# Patient Record
Sex: MALE | URBAN - METROPOLITAN AREA
[De-identification: names, ages, dates, MRNs, and addresses within clinical notes are randomized per-mention and may not be internally consistent; named-entity substitution may affect disease eponyms.]

---
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
## 2022-07-04 ENCOUNTER — HOSPITAL ENCOUNTER (EMERGENCY)
Facility: CLINIC | Age: 22
Discharge: HOME | End: 2022-07-04
Attending: EMERGENCY MEDICINE | Admitting: EMERGENCY MEDICINE
Payer: COMMERCIAL

## 2022-07-04 ENCOUNTER — APPOINTMENT (OUTPATIENT)
Dept: GENERAL RADIOLOGY | Facility: CLINIC | Age: 22
End: 2022-07-04
Attending: EMERGENCY MEDICINE
Payer: COMMERCIAL

## 2022-07-04 VITALS
SYSTOLIC BLOOD PRESSURE: 133 MMHG | OXYGEN SATURATION: 96 % | HEIGHT: 70 IN | DIASTOLIC BLOOD PRESSURE: 64 MMHG | WEIGHT: 180 LBS | HEART RATE: 86 BPM | RESPIRATION RATE: 16 BRPM | BODY MASS INDEX: 25.77 KG/M2 | TEMPERATURE: 98.7 F

## 2022-07-04 DIAGNOSIS — R07.89 ATYPICAL CHEST PAIN: ICD-10-CM

## 2022-07-04 DIAGNOSIS — E10.9 TYPE 1 DIABETES MELLITUS WITHOUT COMPLICATION (H): ICD-10-CM

## 2022-07-04 PROBLEM — E78.5 HYPERLIPIDEMIA LDL GOAL <100: Status: ACTIVE | Noted: 2018-05-22

## 2022-07-04 PROBLEM — E10.10 DIABETIC KETOACIDOSIS WITHOUT COMA ASSOCIATED WITH TYPE 1 DIABETES MELLITUS (H): Status: ACTIVE | Noted: 2022-02-20

## 2022-07-04 LAB
ALBUMIN SERPL-MCNC: 4.2 G/DL (ref 3.4–5)
ALBUMIN UR-MCNC: NEGATIVE MG/DL
ALP SERPL-CCNC: 96 U/L (ref 40–150)
ALT SERPL W P-5'-P-CCNC: 30 U/L (ref 0–70)
ANION GAP SERPL CALCULATED.3IONS-SCNC: 4 MMOL/L (ref 3–14)
APPEARANCE UR: CLEAR
AST SERPL W P-5'-P-CCNC: 18 U/L (ref 0–45)
ATRIAL RATE - MUSE: 94 BPM
BASOPHILS # BLD AUTO: 0 10E3/UL (ref 0–0.2)
BASOPHILS NFR BLD AUTO: 0 %
BILIRUB SERPL-MCNC: 0.5 MG/DL (ref 0.2–1.3)
BILIRUB UR QL STRIP: NEGATIVE
BUN SERPL-MCNC: 14 MG/DL (ref 7–30)
CALCIUM SERPL-MCNC: 9.2 MG/DL (ref 8.5–10.1)
CHLORIDE BLD-SCNC: 97 MMOL/L (ref 94–109)
CO2 SERPL-SCNC: 30 MMOL/L (ref 20–32)
COLOR UR AUTO: ABNORMAL
CREAT SERPL-MCNC: 0.88 MG/DL (ref 0.66–1.25)
CRP SERPL-MCNC: <2.9 MG/L (ref 0–8)
D DIMER PPP FEU-MCNC: <0.27 UG/ML FEU (ref 0–0.5)
DIASTOLIC BLOOD PRESSURE - MUSE: NORMAL MMHG
EOSINOPHIL # BLD AUTO: 0.1 10E3/UL (ref 0–0.7)
EOSINOPHIL NFR BLD AUTO: 1 %
ERYTHROCYTE [DISTWIDTH] IN BLOOD BY AUTOMATED COUNT: 11.3 % (ref 10–15)
GFR SERPL CREATININE-BSD FRML MDRD: >90 ML/MIN/1.73M2
GLUCOSE BLD-MCNC: 256 MG/DL (ref 70–99)
GLUCOSE UR STRIP-MCNC: 1000 MG/DL
HCT VFR BLD AUTO: 48.9 % (ref 40–53)
HGB BLD-MCNC: 16.5 G/DL (ref 13.3–17.7)
HGB UR QL STRIP: NEGATIVE
IMM GRANULOCYTES # BLD: 0 10E3/UL
IMM GRANULOCYTES NFR BLD: 0 %
INTERPRETATION ECG - MUSE: NORMAL
KETONES BLD-SCNC: 0.1 MMOL/L (ref 0–0.6)
KETONES UR STRIP-MCNC: NEGATIVE MG/DL
LEUKOCYTE ESTERASE UR QL STRIP: NEGATIVE
LYMPHOCYTES # BLD AUTO: 1.3 10E3/UL (ref 0.8–5.3)
LYMPHOCYTES NFR BLD AUTO: 21 %
MCH RBC QN AUTO: 28.3 PG (ref 26.5–33)
MCHC RBC AUTO-ENTMCNC: 33.7 G/DL (ref 31.5–36.5)
MCV RBC AUTO: 84 FL (ref 78–100)
MONOCYTES # BLD AUTO: 0.5 10E3/UL (ref 0–1.3)
MONOCYTES NFR BLD AUTO: 7 %
NEUTROPHILS # BLD AUTO: 4.3 10E3/UL (ref 1.6–8.3)
NEUTROPHILS NFR BLD AUTO: 71 %
NITRATE UR QL: NEGATIVE
NRBC # BLD AUTO: 0 10E3/UL
NRBC BLD AUTO-RTO: 0 /100
P AXIS - MUSE: 57 DEGREES
PH UR STRIP: 6.5 [PH] (ref 5–7)
PLATELET # BLD AUTO: 238 10E3/UL (ref 150–450)
POTASSIUM BLD-SCNC: 4.2 MMOL/L (ref 3.4–5.3)
PR INTERVAL - MUSE: 106 MS
PROT SERPL-MCNC: 7.3 G/DL (ref 6.8–8.8)
QRS DURATION - MUSE: 92 MS
QT - MUSE: 332 MS
QTC - MUSE: 415 MS
R AXIS - MUSE: 67 DEGREES
RBC # BLD AUTO: 5.84 10E6/UL (ref 4.4–5.9)
RBC URINE: 0 /HPF
SARS-COV-2 RNA RESP QL NAA+PROBE: NEGATIVE
SODIUM SERPL-SCNC: 131 MMOL/L (ref 133–144)
SP GR UR STRIP: 1.01 (ref 1–1.03)
SYSTOLIC BLOOD PRESSURE - MUSE: NORMAL MMHG
T AXIS - MUSE: 69 DEGREES
TROPONIN I SERPL HS-MCNC: 4 NG/L
UROBILINOGEN UR STRIP-MCNC: NORMAL MG/DL
VENTRICULAR RATE- MUSE: 94 BPM
WBC # BLD AUTO: 6.1 10E3/UL (ref 4–11)
WBC URINE: 0 /HPF

## 2022-07-04 PROCEDURE — 258N000003 HC RX IP 258 OP 636: Performed by: EMERGENCY MEDICINE

## 2022-07-04 PROCEDURE — 80053 COMPREHEN METABOLIC PANEL: CPT | Performed by: EMERGENCY MEDICINE

## 2022-07-04 PROCEDURE — 99285 EMERGENCY DEPT VISIT HI MDM: CPT | Mod: CS,25

## 2022-07-04 PROCEDURE — C9803 HOPD COVID-19 SPEC COLLECT: HCPCS

## 2022-07-04 PROCEDURE — 81001 URINALYSIS AUTO W/SCOPE: CPT | Performed by: EMERGENCY MEDICINE

## 2022-07-04 PROCEDURE — 96374 THER/PROPH/DIAG INJ IV PUSH: CPT

## 2022-07-04 PROCEDURE — 250N000011 HC RX IP 250 OP 636: Performed by: EMERGENCY MEDICINE

## 2022-07-04 PROCEDURE — 71046 X-RAY EXAM CHEST 2 VIEWS: CPT

## 2022-07-04 PROCEDURE — 85025 COMPLETE CBC W/AUTO DIFF WBC: CPT | Performed by: EMERGENCY MEDICINE

## 2022-07-04 PROCEDURE — 86140 C-REACTIVE PROTEIN: CPT | Performed by: EMERGENCY MEDICINE

## 2022-07-04 PROCEDURE — 85379 FIBRIN DEGRADATION QUANT: CPT | Performed by: EMERGENCY MEDICINE

## 2022-07-04 PROCEDURE — 96361 HYDRATE IV INFUSION ADD-ON: CPT

## 2022-07-04 PROCEDURE — 36415 COLL VENOUS BLD VENIPUNCTURE: CPT | Performed by: EMERGENCY MEDICINE

## 2022-07-04 PROCEDURE — U0005 INFEC AGEN DETEC AMPLI PROBE: HCPCS | Performed by: EMERGENCY MEDICINE

## 2022-07-04 PROCEDURE — 93005 ELECTROCARDIOGRAM TRACING: CPT

## 2022-07-04 PROCEDURE — 84484 ASSAY OF TROPONIN QUANT: CPT | Performed by: EMERGENCY MEDICINE

## 2022-07-04 PROCEDURE — 82010 KETONE BODYS QUAN: CPT | Performed by: EMERGENCY MEDICINE

## 2022-07-04 RX ORDER — KETOROLAC TROMETHAMINE 15 MG/ML
30 INJECTION, SOLUTION INTRAMUSCULAR; INTRAVENOUS ONCE
Status: COMPLETED | OUTPATIENT
Start: 2022-07-04 | End: 2022-07-04

## 2022-07-04 RX ORDER — SERTRALINE HYDROCHLORIDE 100 MG/1
TABLET, FILM COATED ORAL
COMMUNITY
Start: 2021-09-07

## 2022-07-04 RX ORDER — INSULIN GLARGINE 100 [IU]/ML
38-42 INJECTION, SOLUTION SUBCUTANEOUS
COMMUNITY
Start: 2020-08-26

## 2022-07-04 RX ORDER — LISDEXAMFETAMINE DIMESYLATE 40 MG/1
CAPSULE ORAL
COMMUNITY
Start: 2021-09-18

## 2022-07-04 RX ORDER — SIMVASTATIN 10 MG
10 TABLET ORAL
COMMUNITY
Start: 2021-12-30 | End: 2022-12-30

## 2022-07-04 RX ORDER — INSULIN ASPART 100 [IU]/ML
INJECTION, SOLUTION INTRAVENOUS; SUBCUTANEOUS
COMMUNITY
Start: 2020-08-31

## 2022-07-04 RX ORDER — DEXTROAMPHETAMINE SACCHARATE, AMPHETAMINE ASPARTATE, DEXTROAMPHETAMINE SULFATE AND AMPHETAMINE SULFATE 5; 5; 5; 5 MG/1; MG/1; MG/1; MG/1
TABLET ORAL
COMMUNITY
Start: 2021-09-09

## 2022-07-04 RX ADMIN — KETOROLAC TROMETHAMINE 30 MG: 15 INJECTION, SOLUTION INTRAMUSCULAR; INTRAVENOUS at 14:38

## 2022-07-04 RX ADMIN — SODIUM CHLORIDE 1000 ML: 9 INJECTION, SOLUTION INTRAVENOUS at 12:56

## 2022-07-04 ASSESSMENT — ENCOUNTER SYMPTOMS
CHEST TIGHTNESS: 1
DIARRHEA: 0
NAUSEA: 0
VOMITING: 0
ABDOMINAL PAIN: 0
FEVER: 0
CHILLS: 0
SHORTNESS OF BREATH: 0
COUGH: 0

## 2022-07-04 NOTE — DISCHARGE INSTRUCTIONS
Ice to your chest wall, Tylenol or Motrin as needed.  Follow your diabetic diet, continue your insulin as directed and monitor your blood sugars.  Avoid activity that makes the pain worse.  If you get significantly worse and develop shortness of breath and fevers, you should be reevaluated.  Otherwise follow-up as needed.

## 2022-07-04 NOTE — ED PROVIDER NOTES
History   Chief Complaint:  Chest Pain (Pt c/o CP since yesterday.)       HPI   Abisai Rosenberg is a 21 year old male with history of type 1 diabetes on insulin who presents with some left anterior shoulder and left-sided chest pain for couple of days.  He just has not felt well for the past 4 to 5 days.  No trauma.  He does  hockey and plays hockey.  He is scheduled to go on vacation with his family to [place] tomorrow.  He is never had blood clots and does not have any leg swelling.  The pain is sharp at times but sometimes just feels tight.  He is not short of breath.  He is not coughing much.  No fevers or chills.  No nausea.  It started actually just anterior left shoulder and then moved to the left peristernal area in his chest wall.  Does not seem to hurt worse with movement.  No other associated signs or symptoms.  He is vaccinated against COVID.  He does state that at times he will get lightheaded spontaneously but it never happens when he is exerting self or skating or working out.  He has never passed out.    Review of Systems   Constitutional: Negative for chills and fever.   Respiratory: Positive for chest tightness. Negative for cough and shortness of breath.    Cardiovascular: Positive for chest pain. Negative for leg swelling.   Gastrointestinal: Negative for abdominal pain, diarrhea, nausea and vomiting.   All other systems reviewed and are negative.    Allergies:  The patient denies any known drug allergies.     Medications:  Insulin   Zocor   Adderall  Norco   Zoloft  Vyanse   Novolog   Zyrtec   Minocin     Past Medical History:     DKA   Diabetes type I  Hyperlipidemia   Lipohypertrophy   Tibula fracture   ADHD  MDD  GERD    Past Surgical History:    Elbow surgery   Orif tibia and fibula fractures     Social History:  Patient came from home.    Physical Exam     Patient Vitals for the past 24 hrs:   BP Temp Temp src Pulse Resp SpO2 Height Weight   07/04/22 1430 -- -- -- 91 11 98 % -- --  "  07/04/22 1400 -- -- -- 93 12 99 % -- --   07/04/22 1330 -- -- -- 96 15 99 % -- --   07/04/22 1300 -- -- -- 80 10 99 % -- --   07/04/22 1229 139/76 98.7  F (37.1  C) Oral 100 16 99 % 1.778 m (5' 10\") 81.6 kg (180 lb)       Physical Exam   Nursing note and vitals reviewed.    Constitutional:  Appears comfortable.    HENT:    Nose normal.  No discharge.      Oral mucosa is moist.  Eyes:    Conjunctivae are normal without injection.  Pupils are equal.  Cardiovascular:  Normal rate, regular rhythm with normal S1 and S2.      Normal heart sounds and peripheral pulses 2+ and equal.       No murmur or annita.  Pulmonary:  Effort normal and breath sounds clear to auscultation bilaterally.     No respiratory distress.  No stridor.     No wheezes. No rales.   No reproducible chest wall tenderness.  GI:    Soft. No distension and no mass. No tenderness.   Musculoskeletal:  Normal range of motion. No extremity deformity.     No edema and no tenderness.    Skin:    Skin is warm and dry. No rash noted.   Psychiatric:   Behavior is normal. Appropriate mood and affect.     Judgment and thought content normal.     Emergency Department Course   ECG  ECG results from 07/04/22   EKG 12-lead, tracing only     Value    Systolic Blood Pressure     Diastolic Blood Pressure     Ventricular Rate 94    Atrial Rate 94    MT Interval 106    QRS Duration 92        QTc 415    P Axis 57    R AXIS 67    T Axis 69    Interpretation ECG      Sinus rhythm with short MT  Otherwise normal ECG  No previous ECGs available  Confirmed by GENERATED REPORT, COMPUTER ([id number]),  Alma Tatum ([id number]) on 7/4/2022 1:39:01 PM         Imaging:  Chest XR,  PA & LAT   Preliminary Result   IMPRESSION: Negative chest.        Report per radiology    Laboratory:  Labs Ordered and Resulted from Time of ED Arrival to Time of ED Departure   COMPREHENSIVE METABOLIC PANEL - Abnormal       Result Value    Sodium 131 (*)     Potassium 4.2      Chloride 97      Carbon " Dioxide (CO2) 30      Anion Gap 4      Urea Nitrogen 14      Creatinine 0.88      Calcium 9.2      Glucose 256 (*)     Alkaline Phosphatase 96      AST 18      ALT 30      Protein Total 7.3      Albumin 4.2      Bilirubin Total 0.5      GFR Estimate >90     ROUTINE UA WITH MICROSCOPIC REFLEX TO CULTURE - Abnormal    Color Urine Straw      Appearance Urine Clear      Glucose Urine 1000  (*)     Bilirubin Urine Negative      Ketones Urine Negative      Specific Gravity Urine 1.007      Blood Urine Negative      pH Urine 6.5      Protein Albumin Urine Negative      Urobilinogen Urine Normal      Nitrite Urine Negative      Leukocyte Esterase Urine Negative      RBC Urine 0      WBC Urine 0     TROPONIN I - Normal    Troponin I High Sensitivity 4     CRP INFLAMMATION - Normal    CRP Inflammation <2.9     D DIMER QUANTITATIVE - Normal    D-Dimer Quantitative <0.27     COVID-19 VIRUS (CORONAVIRUS) BY PCR - Normal    SARS CoV2 PCR Negative     KETONE BETA-HYDROXYBUTYRATE QUANTITATIVE, RAPID - Normal    Ketone (Beta-Hydroxybutyrate) Quantitative 0.1     CBC WITH PLATELETS AND DIFFERENTIAL    WBC Count 6.1      RBC Count 5.84      Hemoglobin 16.5      Hematocrit 48.9      MCV 84      MCH 28.3      MCHC 33.7      RDW 11.3      Platelet Count 238      % Neutrophils 71      % Lymphocytes 21      % Monocytes 7      % Eosinophils 1      % Basophils 0      % Immature Granulocytes 0      NRBCs per 100 WBC 0      Absolute Neutrophils 4.3      Absolute Lymphocytes 1.3      Absolute Monocytes 0.5      Absolute Eosinophils 0.1      Absolute Basophils 0.0      Absolute Immature Granulocytes 0.0      Absolute NRBCs 0.0          Emergency Department Course:         Reviewed:  I reviewed nursing notes, vitals, past medical history and Care Everywhere    Assessments:  1241 I obtained history and examined the patient as noted above.   1400 I rechecked the patient and explained findings.   1455 I rechecked the patient and explained findings.  I explained the plan of care to the patient.     Interventions:  Medications   0.9% sodium chloride BOLUS (0 mLs Intravenous Stopped 7/4/22 1404)   ketorolac (TORADOL) injection 30 mg (30 mg Intravenous Given 7/4/22 1438)     Disposition:  The patient was discharged to home.     Impression & Plan       Medical Decision Making:  Patient comes in with some atypical chest pain on the left side.  An EKG is normal, I obtained a comprehensive metabolic panel which is normal, CRP is normal ketones are normal and his troponin is normal.  His blood sugar was high at 256 but normal CBC.  I do not suspect cardiac inflammation or ischemia, no evidence of a blood clot.  His D-dimer is normal.  UA showed sugar but otherwise unremarkable.  COVID is negative.  Chest x-ray normal.  He was given Toradol and reassurance was given that I am not finding anything serious.  Could be musculoskeletal.  He is going to use ibuprofen as needed and if not resolving over the next week, follow-up in the clinic.  If he gets worse before then he will follow-up sooner.  Is possible he could have a mild cold.  There have been some times where he said that he felt lightheaded and did not know why.  Mom wondered if he should see a cardiologist.  I did mention to her that if he feels lightheaded, he should check his pulse and if its very slow or very rapid, this would require further work-up and possible cardiology consultation.  If he ever gets the symptoms when he is exerting himself which has not happened to this point where he passes out while exercising, then he definitely needs to go to the emergency room and needs further work-up.  Mom is comfortable with this plan.    Ice to your chest wall, Tylenol or Motrin as needed.  Follow your diabetic diet, continue your insulin as directed and monitor your blood sugars.  Avoid activity that makes the pain worse.  If you get significantly worse and develop shortness of breath and fevers, you should be  reevaluated.  Otherwise follow-up as needed.      Diagnosis:    ICD-10-CM    1. Atypical chest pain  R07.89    2. Type 1 diabetes mellitus without complication (H)  E10.9        Discharge Medications:  New Prescriptions    No medications on file       Scribe Disclosure:  Carissa VENTURA Ismail, am serving as a scribe at 12:47 PM on 7/4/2022 to document services personally performed by Olga Browning MD based on my observations and the provider's statements to me.          Olga Browning MD  07/04/22 1528